# Patient Record
Sex: FEMALE | Race: WHITE | NOT HISPANIC OR LATINO | Employment: OTHER | URBAN - METROPOLITAN AREA
[De-identification: names, ages, dates, MRNs, and addresses within clinical notes are randomized per-mention and may not be internally consistent; named-entity substitution may affect disease eponyms.]

---

## 2023-01-01 ENCOUNTER — NURSING HOME VISIT (OUTPATIENT)
Dept: PRIMARY CARE | Facility: CLINIC | Age: 88
End: 2023-01-01
Payer: MEDICARE

## 2023-01-01 ENCOUNTER — NURSING HOME VISIT (OUTPATIENT)
Dept: POST ACUTE CARE | Facility: EXTERNAL LOCATION | Age: 88
End: 2023-01-01
Payer: MEDICARE

## 2023-01-01 ENCOUNTER — TELEPHONE (OUTPATIENT)
Dept: PRIMARY CARE | Facility: CLINIC | Age: 88
End: 2023-01-01
Payer: MEDICARE

## 2023-01-01 VITALS
HEART RATE: 82 BPM | RESPIRATION RATE: 18 BRPM | OXYGEN SATURATION: 90 % | DIASTOLIC BLOOD PRESSURE: 47 MMHG | SYSTOLIC BLOOD PRESSURE: 106 MMHG

## 2023-01-01 VITALS
SYSTOLIC BLOOD PRESSURE: 90 MMHG | HEART RATE: 74 BPM | RESPIRATION RATE: 16 BRPM | OXYGEN SATURATION: 99 % | DIASTOLIC BLOOD PRESSURE: 45 MMHG | TEMPERATURE: 97.4 F

## 2023-01-01 DIAGNOSIS — D47.2 MGUS (MONOCLONAL GAMMOPATHY OF UNKNOWN SIGNIFICANCE): ICD-10-CM

## 2023-01-01 DIAGNOSIS — A04.72 C. DIFFICILE COLITIS: ICD-10-CM

## 2023-01-01 DIAGNOSIS — E78.5 HYPERLIPIDEMIA, UNSPECIFIED HYPERLIPIDEMIA TYPE: ICD-10-CM

## 2023-01-01 DIAGNOSIS — E56.9 VITAMIN DEFICIENCY: ICD-10-CM

## 2023-01-01 DIAGNOSIS — I48.91 ATRIAL FIBRILLATION, UNSPECIFIED TYPE (MULTI): ICD-10-CM

## 2023-01-01 DIAGNOSIS — I50.9 CHRONIC CONGESTIVE HEART FAILURE, UNSPECIFIED HEART FAILURE TYPE (MULTI): ICD-10-CM

## 2023-01-01 DIAGNOSIS — I95.1 ORTHOSTATIC HYPOTENSION: ICD-10-CM

## 2023-01-01 DIAGNOSIS — M81.0 OSTEOPOROSIS, UNSPECIFIED OSTEOPOROSIS TYPE, UNSPECIFIED PATHOLOGICAL FRACTURE PRESENCE: ICD-10-CM

## 2023-01-01 DIAGNOSIS — I48.11 LONGSTANDING PERSISTENT ATRIAL FIBRILLATION (MULTI): ICD-10-CM

## 2023-01-01 DIAGNOSIS — M62.81 MUSCLE WEAKNESS (GENERALIZED): Primary | ICD-10-CM

## 2023-01-01 DIAGNOSIS — E78.00 HYPERCHOLESTEROLEMIA: ICD-10-CM

## 2023-01-01 DIAGNOSIS — I48.21 PERMANENT ATRIAL FIBRILLATION WITH RVR (MULTI): ICD-10-CM

## 2023-01-01 DIAGNOSIS — M15.9 GENERALIZED OSTEOARTHRITIS OF MULTIPLE SITES: ICD-10-CM

## 2023-01-01 DIAGNOSIS — I10 PRIMARY HYPERTENSION: ICD-10-CM

## 2023-01-01 DIAGNOSIS — R11.0 NAUSEA: ICD-10-CM

## 2023-01-01 DIAGNOSIS — G47.00 INSOMNIA, UNSPECIFIED TYPE: ICD-10-CM

## 2023-01-01 DIAGNOSIS — D47.2 MONOCLONAL GAMMOPATHY OF UNKNOWN SIGNIFICANCE: ICD-10-CM

## 2023-01-01 DIAGNOSIS — I10 ESSENTIAL HYPERTENSION: ICD-10-CM

## 2023-01-01 DIAGNOSIS — I50.9 CONGESTIVE HEART FAILURE, UNSPECIFIED HF CHRONICITY, UNSPECIFIED HEART FAILURE TYPE (MULTI): Primary | ICD-10-CM

## 2023-01-01 DIAGNOSIS — I07.1 SEVERE TRICUSPID VALVE REGURGITATION: ICD-10-CM

## 2023-01-01 PROCEDURE — 99309 SBSQ NF CARE MODERATE MDM 30: CPT | Performed by: NURSE PRACTITIONER

## 2023-01-01 PROCEDURE — 99309 SBSQ NF CARE MODERATE MDM 30: CPT | Performed by: FAMILY MEDICINE

## 2023-01-01 PROCEDURE — 99305 1ST NF CARE MODERATE MDM 35: CPT | Performed by: FAMILY MEDICINE

## 2023-01-01 PROCEDURE — 99308 SBSQ NF CARE LOW MDM 20: CPT | Performed by: NURSE PRACTITIONER

## 2023-01-01 PROCEDURE — 99310 SBSQ NF CARE HIGH MDM 45: CPT | Performed by: NURSE PRACTITIONER

## 2023-01-01 ASSESSMENT — ENCOUNTER SYMPTOMS
APPETITE CHANGE: 0
NAUSEA: 0
UNEXPECTED WEIGHT CHANGE: 0
APPETITE CHANGE: 0
UNEXPECTED WEIGHT CHANGE: 0
NAUSEA: 0

## 2023-03-22 NOTE — LETTER
Patient: Elsy Tyson  : 1931    Encounter Date: 2023    *Provider Impression*    Patient is a 89 year old female  who is seen today for management of multiple medical problems       #Weakness / Orthostatic hypotension / OA - PT/OT, midodrine 10mg TID PRN, acetaminophen 650mg q4h PRN, Norco 5/325mg q8h PRN  #A-fib / HTN / HLD / CHF - xarelto 15mg daily, torsemide 20mg daily, metoprolol ER 50mg daily  #Osteoporosis - prolia 60mg/mL, calcium 600mg daily  #MGUS - f/u w/ hematology  #Nausea - zofran 4mg q6h PRN  #Vitamin deficiency - vitamin E 100units daily, cerovite daily  #Insomnia - melatonin 5mg QHS  Follow up as needed      *Chief Complaint*     weakness    *History of Present Illness*    Patient is a 89 y/o female w/ PMH as below who presented to [facility] ED from wound care clinic with shortness of breath.  The patient was in her usual state of health when she came to her usual scheduled wound clinic appointment on 3/10.  However, she began to feel short of breath with general malaise, and wound care staff noted that she appeared to be very pale.  Labs obtained at her appointment revealed a hemoglobin of 5.7, WBC 70.1 (ANC 20.33, lymphocyte count 44.86) and platelets 96.  A CT CAP was obtained which showed extensive and coalescent lymphadenopathy, continuously progressing over the course of numerous prior studies and significantly progressed from 2020, possibly relating to lymphoma/leukemia.  Due to concern for newly diagnosed lymphoma/leukemia, she was admitted to [facility] for further evaluation from the heme-onc team. She had followed with heme-onc since  after she initially presented with severe anemia and was found to have incidental total presence of monoclonal protein on protein electrophoresis.  She was also found to have a 4.4 x 3.8 x 3.1 cm complex cystic lesion at the splenic hilum which has been managed with close observation.  At [facility], Patient received 2 blood transfusions  in the ED.  Heme/Onc Dr. Chan was consulted and seen the patient. He recommended further workup including flow cytometry and peripheral smear. Given the patient comfort care status and age, she is not a candidate for any agressive investigational chemotherapy. So they recommended follow up outpatient for possible mild medications treatment. Her INR was supratherapeutic and received Vitamin K. Patient was hypotensive and hypoxic (on3-5L NC O2)  throughout her hospitalization. She was placed initially for sepsis concern (source: left wound ulcer/cellulitis) on IV Abx including Vancomycin and cefepime and were discontinued. Her BP meds were held and was receiving fluid Boluses daily. Started on Midodrine which improved BP. Interesting she asymptomatic even when BP was low. She did develop diarrhea and was positive for Clostridium Difficile. She was started on oral Vancomycin 125mg Oral daily every 6hours and was d/c w/ 6 more days of vanco, w/ cabrera and plan for voiding trial and/or f/u w/ urology. Outpatient oncology f/u. And was d/c to [facility] @ [place].     Her labs appreciate today w/ WBC up still, renal function stable.    She is seen sitting up in her room today and denies any f/c, sweats, CP, SOB, cough, n/v, still diarrhea, no abd pain, LUTS, edema, or any other c/o presently.       Alleriges - NKDA  PMH - severe tricuspid regurgitation, right-sided heart failure, chronic venous ulcer on her left leg, cystic pancreatic mass, MGUS, paroxysmal A-fib on rivaroxaban, HTN, HLD, CHF, hepatic cyst, hiatal hernia, pancreatic cyst,   PSH - excision melanoma, hysterectomy  FH - heart disease, pancreatic cancer  SocHx - never smoker, No EtOH    *Review of Systems*  All other systems reviewed are negative except as noted in the HPI     *Vital Signs*   Date: 3/22/23 - T: 98.1  P: 82  R: 16  BP: 103/44  SpO2: 98% on O2      *Results / Data*  CBC - Date: 3/22/23  WBC: 52.54  HGB: 8.9  HCT: 28.0  PLT: 162  ;   BMP - Date: 3/22/23   Na: 136  K: 4.2  Cl: 105  CO2: 21  BUN: 38  Cr: 1.63  Glu: 99  Ca: 8.1  ;   LFT - Date: 3/22/23  AST: 51  ALT: 25  ALP: 230  TBili: 0.8  ;   Coags - Date:   INR:   PT:    *Physical Exam*  Gen: (+) NAD, (+) well-appearing  HEENT: (+) normocephalic, (+) MMM  Neck: (+) supple  Lungs: (+) CTAB, (-) wheezes, (-) rales, (-) rhonchi  Heart: (+) RRR, (+) S1 S2, (-) murmurs  Pulses: (+) palpable  Abd: (+) soft, (+) NT, (+) ND, (+) BS+  Ext: (-) edema, (-) deformity  MSK: (-) joint swelling  Skin: (+) warm, (+) dry, (-) rash  Neuro: (+) follows commands, (-) tremor, (+) alert      Electronically Signed By: XIANG Bonilla-CNP   3/28/23 12:30 AM

## 2023-03-28 PROBLEM — I48.91 ATRIAL FIBRILLATION (MULTI): Status: ACTIVE | Noted: 2023-01-01

## 2023-03-28 PROBLEM — I07.1 SEVERE TRICUSPID VALVE REGURGITATION: Status: ACTIVE | Noted: 2023-01-01

## 2023-03-28 PROBLEM — D47.2 MONOCLONAL GAMMOPATHY OF UNKNOWN SIGNIFICANCE: Status: ACTIVE | Noted: 2023-01-01

## 2023-03-28 PROBLEM — I48.21 PERMANENT ATRIAL FIBRILLATION WITH RVR (MULTI): Status: ACTIVE | Noted: 2023-01-01

## 2023-03-28 PROBLEM — E78.00 HYPERCHOLESTEROLEMIA: Status: ACTIVE | Noted: 2023-01-01

## 2023-03-28 PROBLEM — A04.72 C. DIFFICILE COLITIS: Status: ACTIVE | Noted: 2023-01-01

## 2023-03-28 PROBLEM — I50.9 CHF (CONGESTIVE HEART FAILURE) (MULTI): Status: ACTIVE | Noted: 2023-01-01

## 2023-03-28 PROBLEM — I10 HYPERTENSION: Status: ACTIVE | Noted: 2023-01-01

## 2023-03-28 NOTE — PROGRESS NOTES
Subjective   Patient ID: Elsy Tyson is a 89 y.o. female who presents for No chief complaint on file..    HPI   Admitted to acute care for shortness of breath cellulitis pleural effusions and found to have MG US   Developed C. difficile   Here for rehab  Review of Systems   Constitutional:  Negative for appetite change and unexpected weight change.   Eyes:  Negative for visual disturbance.   Gastrointestinal:  Negative for nausea.       Objective   There were no vitals taken for this visit.    Physical Exam  HENT:      Head: Normocephalic and atraumatic.      Nose: Nose normal.      Mouth/Throat:      Mouth: Mucous membranes are moist.      Pharynx: No oropharyngeal exudate.   Eyes:      Extraocular Movements: Extraocular movements intact.      Conjunctiva/sclera: Conjunctivae normal.      Pupils: Pupils are equal, round, and reactive to light.   Cardiovascular:      Rate and Rhythm: Normal rate and regular rhythm.   Pulmonary:      Effort: Pulmonary effort is normal.   Abdominal:      General: There is no distension.      Palpations: Abdomen is soft.   Musculoskeletal:      Cervical back: Normal range of motion and neck supple.   Lymphadenopathy:      Cervical: No cervical adenopathy.   Neurological:      General: No focal deficit present.      Mental Status: She is alert.   Psychiatric:         Attention and Perception: Attention normal.         Speech: Speech normal.         Behavior: Behavior is cooperative.         Assessment/Plan   Diagnoses and all orders for this visit:  Congestive heart failure, unspecified HF chronicity, unspecified heart failure type (CMS/HCC)  Comments:  On diuresis but hypotensive  Decrease torsemide to 10 mg daily  Increase midodrine to 10 mg 4 times daily from 3 times daily  Follow blood pressures  Primary hypertension  Comments:  Currently hypotensive, follow  Longstanding persistent atrial fibrillation (CMS/HCC)  Comments:  Rate controlled  Permanent atrial fibrillation with  RVR (CMS/HCC)  Severe tricuspid valve regurgitation  Monoclonal gammopathy of unknown significance  Comments:  No aggressive therapy or work-up planned at this time given patient's age and overall medical condition  Hypercholesterolemia  C. difficile colitis  Comments:  Being treated, follow contact precautions    We will follow closely in nursing home rehab, plan next visit 1 week sooner if necessary  Continue with PT OT ST

## 2023-03-28 NOTE — PROGRESS NOTES
*Provider Impression*    Patient is a 89 year old female  who is seen today for management of multiple medical problems       #Weakness / Orthostatic hypotension / OA - PT/OT, midodrine 10mg TID PRN, acetaminophen 650mg q4h PRN, Norco 5/325mg q8h PRN  #A-fib / HTN / HLD / CHF - xarelto 15mg daily, torsemide 20mg daily, metoprolol ER 50mg daily  #Osteoporosis - prolia 60mg/mL, calcium 600mg daily  #MGUS - f/u w/ hematology  #Nausea - zofran 4mg q6h PRN  #Vitamin deficiency - vitamin E 100units daily, cerovite daily  #Insomnia - melatonin 5mg QHS  Follow up as needed      *Chief Complaint*     weakness    *History of Present Illness*    Patient is a 89 y/o female w/ PMH as below who presented to [facility] ED from wound care clinic with shortness of breath.  The patient was in her usual state of health when she came to her usual scheduled wound clinic appointment on 3/10.  However, she began to feel short of breath with general malaise, and wound care staff noted that she appeared to be very pale.  Labs obtained at her appointment revealed a hemoglobin of 5.7, WBC 70.1 (ANC 20.33, lymphocyte count 44.86) and platelets 96.  A CT CAP was obtained which showed extensive and coalescent lymphadenopathy, continuously progressing over the course of numerous prior studies and significantly progressed from 2/23/2020, possibly relating to lymphoma/leukemia.  Due to concern for newly diagnosed lymphoma/leukemia, she was admitted to [facility] for further evaluation from the heme-onc team. She had followed with heme-onc since 2015 after she initially presented with severe anemia and was found to have incidental total presence of monoclonal protein on protein electrophoresis.  She was also found to have a 4.4 x 3.8 x 3.1 cm complex cystic lesion at the splenic hilum which has been managed with close observation.  At [facility], Patient received 2 blood transfusions in the ED.  Heme/Onc Dr. Chan was consulted and seen the patient. He  recommended further workup including flow cytometry and peripheral smear. Given the patient comfort care status and age, she is not a candidate for any agressive investigational chemotherapy. So they recommended follow up outpatient for possible mild medications treatment. Her INR was supratherapeutic and received Vitamin K. Patient was hypotensive and hypoxic (on3-5L NC O2)  throughout her hospitalization. She was placed initially for sepsis concern (source: left wound ulcer/cellulitis) on IV Abx including Vancomycin and cefepime and were discontinued. Her BP meds were held and was receiving fluid Boluses daily. Started on Midodrine which improved BP. Interesting she asymptomatic even when BP was low. She did develop diarrhea and was positive for Clostridium Difficile. She was started on oral Vancomycin 125mg Oral daily every 6hours and was d/c w/ 6 more days of vanco, w/ cabrera and plan for voiding trial and/or f/u w/ urology. Outpatient oncology f/u. And was d/c to [facility] @ [place].     Her labs appreciate today w/ WBC up still, renal function stable.    She is seen sitting up in her room today and denies any f/c, sweats, CP, SOB, cough, n/v, still diarrhea, no abd pain, LUTS, edema, or any other c/o presently.       Alleriges - NKDA  PMH - severe tricuspid regurgitation, right-sided heart failure, chronic venous ulcer on her left leg, cystic pancreatic mass, MGUS, paroxysmal A-fib on rivaroxaban, HTN, HLD, CHF, hepatic cyst, hiatal hernia, pancreatic cyst,   PSH - excision melanoma, hysterectomy  FH - heart disease, pancreatic cancer  SocHx - never smoker, No EtOH    *Review of Systems*  All other systems reviewed are negative except as noted in the HPI     *Vital Signs*   Date: 3/22/23 - T: 98.1  P: 82  R: 16  BP: 103/44  SpO2: 98% on O2      *Results / Data*  CBC - Date: 3/22/23  WBC: 52.54  HGB: 8.9  HCT: 28.0  PLT: 162  ;   BMP - Date: 3/22/23  Na: 136  K: 4.2  Cl: 105  CO2: 21  BUN: 38  Cr: 1.63  Glu: 99  Ca: 8.1   ;   LFT - Date: 3/22/23  AST: 51  ALT: 25  ALP: 230  TBili: 0.8  ;   Coags - Date:   INR:   PT:    *Physical Exam*  Gen: (+) NAD, (+) well-appearing  HEENT: (+) normocephalic, (+) MMM  Neck: (+) supple  Lungs: (+) CTAB, (-) wheezes, (-) rales, (-) rhonchi  Heart: (+) RRR, (+) S1 S2, (-) murmurs  Pulses: (+) palpable  Abd: (+) soft, (+) NT, (+) ND, (+) BS+  Ext: (-) edema, (-) deformity  MSK: (-) joint swelling  Skin: (+) warm, (+) dry, (-) rash  Neuro: (+) follows commands, (-) tremor, (+) alert

## 2023-03-29 NOTE — LETTER
Patient: Elsy Tyson  : 1931    Encounter Date: 2023    *Provider Impression*    Patient is a 89 year old female  who is seen today for management of multiple medical problems       #Weakness / Orthostatic hypotension / OA - PT/OT, midodrine 10mg 4x/day, acetaminophen 650mg q4h PRN, Norco 5/325mg q8h PRN  #A-fib / HTN / HLD / CHF - xarelto 15mg daily, torsemide 10mg daily, metoprolol ER 50mg daily  #Osteoporosis - prolia 60mg/mL, calcium 600mg daily  #MGUS - f/u w/ hematology  #Nausea - zofran 4mg q6h PRN  #Vitamin deficiency - vitamin E 100units daily, cerovite daily  #Insomnia - melatonin 5mg QHS  Follow up as needed      *Chief Complaint*     weakness    *History of Present Illness*    Patient is a 89 y/o female w/ PMH as below who presented to [facility] ED from wound care clinic with shortness of breath.  The patient was in her usual state of health when she came to her usual scheduled wound clinic appointment on 3/10.  However, she began to feel short of breath with general malaise, and wound care staff noted that she appeared to be very pale.  Labs obtained at her appointment revealed a hemoglobin of 5.7, WBC 70.1 (ANC 20.33, lymphocyte count 44.86) and platelets 96.  A CT CAP was obtained which showed extensive and coalescent lymphadenopathy, continuously progressing over the course of numerous prior studies and significantly progressed from 2020, possibly relating to lymphoma/leukemia.  Due to concern for newly diagnosed lymphoma/leukemia, she was admitted to [facility] for further evaluation from the heme-onc team. She had followed with heme-onc since  after she initially presented with severe anemia and was found to have incidental total presence of monoclonal protein on protein electrophoresis.  She was also found to have a 4.4 x 3.8 x 3.1 cm complex cystic lesion at the splenic hilum which has been managed with close observation.  At [facility], Patient received 2 blood transfusions  in the ED.  Heme/Onc Dr. Chan was consulted and seen the patient. He recommended further workup including flow cytometry and peripheral smear. Given the patient comfort care status and age, she is not a candidate for any agressive investigational chemotherapy. So they recommended follow up outpatient for possible mild medications treatment. Her INR was supratherapeutic and received Vitamin K. Patient was hypotensive and hypoxic (on3-5L NC O2)  throughout her hospitalization. She was placed initially for sepsis concern (source: left wound ulcer/cellulitis) on IV Abx including Vancomycin and cefepime and were discontinued. Her BP meds were held and was receiving fluid Boluses daily. Started on Midodrine which improved BP. Interesting she asymptomatic even when BP was low. She did develop diarrhea and was positive for Clostridium Difficile. She was started on oral Vancomycin 125mg Oral daily every 6hours and was d/c w/ 6 more days of vanco, w/ cabrera and plan for voiding trial and/or f/u w/ urology. Outpatient oncology f/u. And was d/c to [facility] @ [place].     She is seen sitting up in her room w/ friend at the bedside who expresses some concern for cyanosis in her fingers as this friend has a history of Raynaud's.     Patient reports no pain at all, no f/c, sweats, CP, SOB, cough, n/v, constipation, still loose stools, no numbness, tingling, paresthesais, or any other c/o presently.     Per nursing staff this is intermittent, she is asymptomatic.       Alleriges - NKDA  PMH - severe tricuspid regurgitation, right-sided heart failure, chronic venous ulcer on her left leg, cystic pancreatic mass, MGUS, paroxysmal A-fib on rivaroxaban, HTN, HLD, CHF, hepatic cyst, hiatal hernia, pancreatic cyst,   PSH - excision melanoma, hysterectomy  FH - heart disease, pancreatic cancer  SocHx - never smoker, No EtOH    *Review of Systems*  All other systems reviewed are negative except as noted in the HPI     *Vital Signs*   Date: 3/29/23  - T: 98.0  P: 68  R: 14  BP: 102/45  SpO2: 98% on O2      *Results / Data*  CBC - Date: 3/22/23  WBC: 52.54  HGB: 8.9  HCT: 28.0  PLT: 162  ;   BMP - Date: 3/22/23  Na: 136  K: 4.2  Cl: 105  CO2: 21  BUN: 38  Cr: 1.63  Glu: 99  Ca: 8.1  ;   LFT - Date: 3/22/23  AST: 51  ALT: 25  ALP: 230  TBili: 0.8  ;   Coags - Date:   INR:   PT:    *Physical Exam*  Gen: (+) NAD, (+) well-appearing  HEENT: (+) normocephalic, (+) MMM  Neck: (+) supple  Lungs: (+) CTAB, (-) wheezes, (-) rales, (-) rhonchi  Heart: (+) RRR, (+) S1 S2, (-) murmurs  Pulses: (+) palpable  Abd: (+) soft, (+) NT, (+) ND, (+) BS+  Ext: (-) edema, (-) deformity  MSK: (-) joint swelling  Skin: (+) warm, (+) dry, (-) rash  Neuro: (+) follows commands, (-) tremor, (+) alert      Electronically Signed By: XIANG Bonilla-CNP   4/3/23  3:12 PM

## 2023-04-03 NOTE — PROGRESS NOTES
*Provider Impression*    Patient is a 89 year old female  who is seen today for management of multiple medical problems       #Weakness / Orthostatic hypotension / OA - PT/OT, midodrine 10mg 4x/day, acetaminophen 650mg q4h PRN, Norco 5/325mg q8h PRN  #A-fib / HTN / HLD / CHF - xarelto 15mg daily, torsemide 10mg daily, metoprolol ER 50mg daily  #Osteoporosis - prolia 60mg/mL, calcium 600mg daily  #MGUS - f/u w/ hematology  #Nausea - zofran 4mg q6h PRN  #Vitamin deficiency - vitamin E 100units daily, cerovite daily  #Insomnia - melatonin 5mg QHS  Follow up as needed      *Chief Complaint*     weakness    *History of Present Illness*    Patient is a 89 y/o female w/ PMH as below who presented to [facility] ED from wound care clinic with shortness of breath.  The patient was in her usual state of health when she came to her usual scheduled wound clinic appointment on 3/10.  However, she began to feel short of breath with general malaise, and wound care staff noted that she appeared to be very pale.  Labs obtained at her appointment revealed a hemoglobin of 5.7, WBC 70.1 (ANC 20.33, lymphocyte count 44.86) and platelets 96.  A CT CAP was obtained which showed extensive and coalescent lymphadenopathy, continuously progressing over the course of numerous prior studies and significantly progressed from 2/23/2020, possibly relating to lymphoma/leukemia.  Due to concern for newly diagnosed lymphoma/leukemia, she was admitted to [facility] for further evaluation from the heme-onc team. She had followed with heme-onc since 2015 after she initially presented with severe anemia and was found to have incidental total presence of monoclonal protein on protein electrophoresis.  She was also found to have a 4.4 x 3.8 x 3.1 cm complex cystic lesion at the splenic hilum which has been managed with close observation.  At [facility], Patient received 2 blood transfusions in the ED.  Heme/Onc Dr. Chan was consulted and seen the patient. He  recommended further workup including flow cytometry and peripheral smear. Given the patient comfort care status and age, she is not a candidate for any agressive investigational chemotherapy. So they recommended follow up outpatient for possible mild medications treatment. Her INR was supratherapeutic and received Vitamin K. Patient was hypotensive and hypoxic (on3-5L NC O2)  throughout her hospitalization. She was placed initially for sepsis concern (source: left wound ulcer/cellulitis) on IV Abx including Vancomycin and cefepime and were discontinued. Her BP meds were held and was receiving fluid Boluses daily. Started on Midodrine which improved BP. Interesting she asymptomatic even when BP was low. She did develop diarrhea and was positive for Clostridium Difficile. She was started on oral Vancomycin 125mg Oral daily every 6hours and was d/c w/ 6 more days of vanco, w/ cabrera and plan for voiding trial and/or f/u w/ urology. Outpatient oncology f/u. And was d/c to [facility] @ [place].     She is seen sitting up in her room w/ friend at the bedside who expresses some concern for cyanosis in her fingers as this friend has a history of Raynaud's.     Patient reports no pain at all, no f/c, sweats, CP, SOB, cough, n/v, constipation, still loose stools, no numbness, tingling, paresthesais, or any other c/o presently.     Per nursing staff this is intermittent, she is asymptomatic.       Alleriges - NKDA  PMH - severe tricuspid regurgitation, right-sided heart failure, chronic venous ulcer on her left leg, cystic pancreatic mass, MGUS, paroxysmal A-fib on rivaroxaban, HTN, HLD, CHF, hepatic cyst, hiatal hernia, pancreatic cyst,   PSH - excision melanoma, hysterectomy  FH - heart disease, pancreatic cancer  SocHx - never smoker, No EtOH    *Review of Systems*  All other systems reviewed are negative except as noted in the HPI     *Vital Signs*   Date: 3/29/23 - T: 98.0  P: 68  R: 14  BP: 102/45  SpO2: 98% on O2      *Results /  Data*  CBC - Date: 3/22/23  WBC: 52.54  HGB: 8.9  HCT: 28.0  PLT: 162  ;   BMP - Date: 3/22/23  Na: 136  K: 4.2  Cl: 105  CO2: 21  BUN: 38  Cr: 1.63  Glu: 99  Ca: 8.1  ;   LFT - Date: 3/22/23  AST: 51  ALT: 25  ALP: 230  TBili: 0.8  ;   Coags - Date:   INR:   PT:    *Physical Exam*  Gen: (+) NAD, (+) well-appearing  HEENT: (+) normocephalic, (+) MMM  Neck: (+) supple  Lungs: (+) CTAB, (-) wheezes, (-) rales, (-) rhonchi  Heart: (+) RRR, (+) S1 S2, (-) murmurs  Pulses: (+) palpable  Abd: (+) soft, (+) NT, (+) ND, (+) BS+  Ext: (-) edema, (-) deformity  MSK: (-) joint swelling  Skin: (+) warm, (+) dry, (-) rash  Neuro: (+) follows commands, (-) tremor, (+) alert

## 2023-04-03 NOTE — PROGRESS NOTES
Subjective   Patient ID: Elsy Tyson is a 89 y.o. female who is acute skilled care being seen and evaluated for multiple medical problems.    HPI   Hospitalized for shortness of breath diagnosed with bilateral pleural effusions and also had a left lower extremity cellulitis here at rehab to regain strength and complete treatment  Review of Systems   Constitutional:  Negative for appetite change and unexpected weight change.   Eyes:  Negative for visual disturbance.   Gastrointestinal:  Negative for nausea.       Objective   There were no vitals taken for this visit.    Physical Exam  HENT:      Head: Normocephalic and atraumatic.      Nose: Nose normal.      Mouth/Throat:      Mouth: Mucous membranes are moist.      Pharynx: No oropharyngeal exudate.   Eyes:      Extraocular Movements: Extraocular movements intact.      Conjunctiva/sclera: Conjunctivae normal.      Pupils: Pupils are equal, round, and reactive to light.   Cardiovascular:      Rate and Rhythm: Normal rate and regular rhythm.   Pulmonary:      Effort: Pulmonary effort is normal.   Abdominal:      General: There is no distension.      Palpations: Abdomen is soft.   Musculoskeletal:      Cervical back: Normal range of motion and neck supple.   Lymphadenopathy:      Cervical: No cervical adenopathy.   Neurological:      General: No focal deficit present.      Mental Status: She is alert.   Psychiatric:         Attention and Perception: Attention normal.         Speech: Speech normal.         Behavior: Behavior is cooperative.         Assessment/Plan   Diagnoses and all orders for this visit:  Congestive heart failure, unspecified HF chronicity, unspecified heart failure type (CMS/HCC)  Comments:  Stable  Primary hypertension  Comments:  Stable  Longstanding persistent atrial fibrillation (CMS/HCC)  Comments:  Cardiology follows  C. difficile colitis  Comments:  Improving, still on contact precautions    Appears improved today  Plan on seeing her  again in a week sooner if anything comes up   Goals    None

## 2023-04-03 NOTE — LETTER
Patient: Elsy Tyson  : 1931    Encounter Date: 2023    Subjective  Patient ID: Elsy Tyson is a 89 y.o. female who is acute skilled care being seen and evaluated for multiple medical problems.    HPI   Hospitalized for shortness of breath diagnosed with bilateral pleural effusions and also had a left lower extremity cellulitis here at rehab to regain strength and complete treatment  Review of Systems   Constitutional:  Negative for appetite change and unexpected weight change.   Eyes:  Negative for visual disturbance.   Gastrointestinal:  Negative for nausea.       Objective  There were no vitals taken for this visit.    Physical Exam  HENT:      Head: Normocephalic and atraumatic.      Nose: Nose normal.      Mouth/Throat:      Mouth: Mucous membranes are moist.      Pharynx: No oropharyngeal exudate.   Eyes:      Extraocular Movements: Extraocular movements intact.      Conjunctiva/sclera: Conjunctivae normal.      Pupils: Pupils are equal, round, and reactive to light.   Cardiovascular:      Rate and Rhythm: Normal rate and regular rhythm.   Pulmonary:      Effort: Pulmonary effort is normal.   Abdominal:      General: There is no distension.      Palpations: Abdomen is soft.   Musculoskeletal:      Cervical back: Normal range of motion and neck supple.   Lymphadenopathy:      Cervical: No cervical adenopathy.   Neurological:      General: No focal deficit present.      Mental Status: She is alert.   Psychiatric:         Attention and Perception: Attention normal.         Speech: Speech normal.         Behavior: Behavior is cooperative.         Assessment/Plan  Diagnoses and all orders for this visit:  Congestive heart failure, unspecified HF chronicity, unspecified heart failure type (CMS/HCC)  Comments:  Stable  Primary hypertension  Comments:  Stable  Longstanding persistent atrial fibrillation (CMS/HCC)  Comments:  Cardiology follows  C. difficile colitis  Comments:  Improving, still on  contact precautions    Appears improved today  Plan on seeing her again in a week sooner if anything comes up   Goals    None           Electronically Signed By: Casey Montez MD   4/3/23  1:28 PM

## 2023-04-05 NOTE — LETTER
Patient: Elsy Tyson  : 1931    Encounter Date: 2023    *Provider Impression*    Patient is a 89 year old female  who is seen today for management of multiple medical problems       #Weakness / Orthostatic hypotension / OA - PT/OT, midodrine 10mg 4x/day, acetaminophen 650mg q4h PRN, Norco 5/325mg q8h PRN  #A-fib / HTN / HLD / CHF - xarelto 15mg daily, torsemide 10mg daily, metoprolol ER 50mg daily  #Osteoporosis - prolia 60mg/mL, calcium 600mg daily  #MGUS - f/u w/ hematology  #Nausea - zofran 4mg q6h PRN  #Vitamin deficiency - vitamin E 100units daily, cerovite daily  #Insomnia - melatonin 5mg QHS  Follow up as needed      *Chief Complaint*     weakness    *History of Present Illness*    Patient is a 89 y/o female w/ PMH as below who presented to [facility] ED from wound care clinic with shortness of breath.  The patient was in her usual state of health when she came to her usual scheduled wound clinic appointment on 3/10.  However, she began to feel short of breath with general malaise, and wound care staff noted that she appeared to be very pale.  Labs obtained at her appointment revealed a hemoglobin of 5.7, WBC 70.1 (ANC 20.33, lymphocyte count 44.86) and platelets 96.  A CT CAP was obtained which showed extensive and coalescent lymphadenopathy, continuously progressing over the course of numerous prior studies and significantly progressed from 2020, possibly relating to lymphoma/leukemia.  Due to concern for newly diagnosed lymphoma/leukemia, she was admitted to [facility] for further evaluation from the heme-onc team. She had followed with heme-onc since  after she initially presented with severe anemia and was found to have incidental total presence of monoclonal protein on protein electrophoresis.  She was also found to have a 4.4 x 3.8 x 3.1 cm complex cystic lesion at the splenic hilum which has been managed with close observation.  At [facility], Patient received 2 blood transfusions  in the ED.  Heme/Onc Dr. Chan was consulted and seen the patient. He recommended further workup including flow cytometry and peripheral smear. Given the patient comfort care status and age, she is not a candidate for any agressive investigational chemotherapy. So they recommended follow up outpatient for possible mild medications treatment. Her INR was supratherapeutic and received Vitamin K. Patient was hypotensive and hypoxic (on3-5L NC O2)  throughout her hospitalization. She was placed initially for sepsis concern (source: left wound ulcer/cellulitis) on IV Abx including Vancomycin and cefepime and were discontinued. Her BP meds were held and was receiving fluid Boluses daily. Started on Midodrine which improved BP. Interesting she asymptomatic even when BP was low. She did develop diarrhea and was positive for Clostridium Difficile. She was started on oral Vancomycin 125mg Oral daily every 6hours and was d/c w/ 6 more days of vanco, w/ cabrera and plan for voiding trial and/or f/u w/ urology. Outpatient oncology f/u. And was d/c to [facility] @ [place].     I understand that she may be discharging back to [place] this week.    She is seen sitting up in her room today and denies any f/c, sweats, CP, SOB, cough, n/v/d, constipation, abd pain, edema, or any other new c/o presently.       Alleriges - NKDA  PMH - severe tricuspid regurgitation, right-sided heart failure, chronic venous ulcer on her left leg, cystic pancreatic mass, MGUS, paroxysmal A-fib on rivaroxaban, HTN, HLD, CHF, hepatic cyst, hiatal hernia, pancreatic cyst,   PSH - excision melanoma, hysterectomy  FH - heart disease, pancreatic cancer  SocHx - never smoker, No EtOH    *Review of Systems*  All other systems reviewed are negative except as noted in the HPI     *Vital Signs*   Date: 3/29/23 - T: 98.0  P: 83  R: 18  BP: 93/52  SpO2: 90% on O2      *Results / Data*  CBC - Date: 3/22/23  WBC: 52.54  HGB: 8.9  HCT: 28.0  PLT: 162  ;   BMP - Date: 3/22/23  Na: 136   K: 4.2  Cl: 105  CO2: 21  BUN: 38  Cr: 1.63  Glu: 99  Ca: 8.1  ;   LFT - Date: 3/22/23  AST: 51  ALT: 25  ALP: 230  Tbili: 0.8  ;   Coags - Date:   INR:   PT:    *Physical Exam*  Gen: (+) NAD, (+) well-appearing  HEENT: (+) normocephalic, (+) MMM  Neck: (+) supple  Lungs: (+) CTAB, (-) wheezes, (-) rales, (-) rhonchi  Heart: (+) RRR, (+) S1 S2, (-) murmurs  Pulses: (+) palpable  Abd: (+) soft, (+) NT, (+) ND, (+) BS+  Ext: (-) edema, (-) deformity  MSK: (-) joint swelling  Skin: (+) warm, (+) dry, (-) rash  Neuro: (+) follows commands, (-) tremor, (+) alert      Electronically Signed By: XIANG Bonilla-CNP   4/11/23 11:05 AM

## 2023-04-11 NOTE — PROGRESS NOTES
*Provider Impression*    Patient is a 89 year old female  who is seen today for management of multiple medical problems       #Weakness / Orthostatic hypotension / OA - PT/OT, midodrine 10mg 4x/day, acetaminophen 650mg q4h PRN, Norco 5/325mg q8h PRN  #A-fib / HTN / HLD / CHF - xarelto 15mg daily, torsemide 10mg daily, metoprolol ER 50mg daily  #Osteoporosis - prolia 60mg/mL, calcium 600mg daily  #MGUS - f/u w/ hematology  #Nausea - zofran 4mg q6h PRN  #Vitamin deficiency - vitamin E 100units daily, cerovite daily  #Insomnia - melatonin 5mg QHS  Follow up as needed      *Chief Complaint*     weakness    *History of Present Illness*    Patient is a 89 y/o female w/ PMH as below who presented to [facility] ED from wound care clinic with shortness of breath.  The patient was in her usual state of health when she came to her usual scheduled wound clinic appointment on 3/10.  However, she began to feel short of breath with general malaise, and wound care staff noted that she appeared to be very pale.  Labs obtained at her appointment revealed a hemoglobin of 5.7, WBC 70.1 (ANC 20.33, lymphocyte count 44.86) and platelets 96.  A CT CAP was obtained which showed extensive and coalescent lymphadenopathy, continuously progressing over the course of numerous prior studies and significantly progressed from 2/23/2020, possibly relating to lymphoma/leukemia.  Due to concern for newly diagnosed lymphoma/leukemia, she was admitted to [facility] for further evaluation from the heme-onc team. She had followed with heme-onc since 2015 after she initially presented with severe anemia and was found to have incidental total presence of monoclonal protein on protein electrophoresis.  She was also found to have a 4.4 x 3.8 x 3.1 cm complex cystic lesion at the splenic hilum which has been managed with close observation.  At [facility], Patient received 2 blood transfusions in the ED.  Heme/Onc Dr. Chan was consulted and seen the patient. He  recommended further workup including flow cytometry and peripheral smear. Given the patient comfort care status and age, she is not a candidate for any agressive investigational chemotherapy. So they recommended follow up outpatient for possible mild medications treatment. Her INR was supratherapeutic and received Vitamin K. Patient was hypotensive and hypoxic (on3-5L NC O2)  throughout her hospitalization. She was placed initially for sepsis concern (source: left wound ulcer/cellulitis) on IV Abx including Vancomycin and cefepime and were discontinued. Her BP meds were held and was receiving fluid Boluses daily. Started on Midodrine which improved BP. Interesting she asymptomatic even when BP was low. She did develop diarrhea and was positive for Clostridium Difficile. She was started on oral Vancomycin 125mg Oral daily every 6hours and was d/c w/ 6 more days of vanco, w/ cabrera and plan for voiding trial and/or f/u w/ urology. Outpatient oncology f/u. And was d/c to [facility] @ [place].     I understand that she may be discharging back to [place] this week.    She is seen sitting up in her room today and denies any f/c, sweats, CP, SOB, cough, n/v/d, constipation, abd pain, edema, or any other new c/o presently.       Alleriges - NKDA  PMH - severe tricuspid regurgitation, right-sided heart failure, chronic venous ulcer on her left leg, cystic pancreatic mass, MGUS, paroxysmal A-fib on rivaroxaban, HTN, HLD, CHF, hepatic cyst, hiatal hernia, pancreatic cyst,   PSH - excision melanoma, hysterectomy  FH - heart disease, pancreatic cancer  SocHx - never smoker, No EtOH    *Review of Systems*  All other systems reviewed are negative except as noted in the HPI     *Vital Signs*   Date: 3/29/23 - T: 98.0  P: 83  R: 18  BP: 93/52  SpO2: 90% on O2      *Results / Data*  CBC - Date: 3/22/23  WBC: 52.54  HGB: 8.9  HCT: 28.0  PLT: 162  ;   BMP - Date: 3/22/23  Na: 136  K: 4.2  Cl: 105  CO2: 21  BUN: 38  Cr: 1.63  Glu: 99  Ca: 8.1  ;   LFT  - Date: 3/22/23  AST: 51  ALT: 25  ALP: 230  Tbili: 0.8  ;   Coags - Date:   INR:   PT:    *Physical Exam*  Gen: (+) NAD, (+) well-appearing  HEENT: (+) normocephalic, (+) MMM  Neck: (+) supple  Lungs: (+) CTAB, (-) wheezes, (-) rales, (-) rhonchi  Heart: (+) RRR, (+) S1 S2, (-) murmurs  Pulses: (+) palpable  Abd: (+) soft, (+) NT, (+) ND, (+) BS+  Ext: (-) edema, (-) deformity  MSK: (-) joint swelling  Skin: (+) warm, (+) dry, (-) rash  Neuro: (+) follows commands, (-) tremor, (+) alert

## 2023-04-25 NOTE — TELEPHONE ENCOUNTER
Marco from [facility] called in stating the pts Death Certificate is in the system to sign electronically. Pt was at [place].